# Patient Record
Sex: FEMALE | Race: WHITE | URBAN - METROPOLITAN AREA
[De-identification: names, ages, dates, MRNs, and addresses within clinical notes are randomized per-mention and may not be internally consistent; named-entity substitution may affect disease eponyms.]

---

## 2022-10-31 ENCOUNTER — OFFICE VISIT (OUTPATIENT)
Dept: URBAN - METROPOLITAN AREA CLINIC 26 | Facility: CLINIC | Age: 22
End: 2022-10-31
Payer: COMMERCIAL

## 2022-10-31 DIAGNOSIS — H57.13 OCULAR PAIN, BILATERAL: ICD-10-CM

## 2022-10-31 DIAGNOSIS — H57.02 ANISOCORIA: Primary | ICD-10-CM

## 2022-10-31 DIAGNOSIS — H52.223 REGULAR ASTIGMATISM, BILATERAL: ICD-10-CM

## 2022-10-31 PROCEDURE — 92004 COMPRE OPH EXAM NEW PT 1/>: CPT | Performed by: OPTOMETRIST

## 2022-10-31 PROCEDURE — 92134 CPTRZ OPH DX IMG PST SGM RTA: CPT | Performed by: OPTOMETRIST

## 2022-10-31 ASSESSMENT — KERATOMETRY
OD: 43.25
OS: 43.50

## 2022-10-31 ASSESSMENT — VISUAL ACUITY
OS: 20/20
OD: 20/20

## 2022-10-31 ASSESSMENT — INTRAOCULAR PRESSURE
OD: 10
OS: 12

## 2022-10-31 NOTE — IMPRESSION/PLAN
Impression: Anisocoria: H57.02. Plan: phsysiological anisocoria - mild. pt reassurance pupils respond appropriately. no pupil abnormality observed.

## 2022-10-31 NOTE — IMPRESSION/PLAN
Impression: Ocular pain, bilateral: H57.13. Plan: c/o eyestrain. no ocular etiology observed to account for pt complaint. EOM's full. mild convergence excess, however, pt notices fatigue even upon awakening. hx of migraines. recommend work with PCP/Neurology if persists.